# Patient Record
Sex: MALE | Race: WHITE | HISPANIC OR LATINO | URBAN - METROPOLITAN AREA
[De-identification: names, ages, dates, MRNs, and addresses within clinical notes are randomized per-mention and may not be internally consistent; named-entity substitution may affect disease eponyms.]

---
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
## 2024-04-03 ENCOUNTER — OFFICE VISIT (OUTPATIENT)
Dept: URGENT CARE | Age: 19
End: 2024-04-03
Payer: COMMERCIAL

## 2024-04-03 VITALS
RESPIRATION RATE: 18 BRPM | OXYGEN SATURATION: 98 % | TEMPERATURE: 97.8 F | HEIGHT: 70 IN | WEIGHT: 200.6 LBS | HEART RATE: 76 BPM | BODY MASS INDEX: 28.72 KG/M2 | SYSTOLIC BLOOD PRESSURE: 138 MMHG | DIASTOLIC BLOOD PRESSURE: 70 MMHG

## 2024-04-03 DIAGNOSIS — Z02.4 DRIVER'S PERMIT PE (PHYSICAL EXAMINATION): Primary | ICD-10-CM

## 2024-04-03 NOTE — PROGRESS NOTES
[facility]        NAME: Shaquille Mello is a 19 y.o. male  : 2005    MRN: [id number]  DATE: April 3, 2024  TIME: 12:59 PM    Assessment and Plan   's permit PE (physical examination) [Z02.4]  1. 's permit PE (physical examination)            It is my medical option that patient has no history of or evidence of chronic illnesses that would limit their ability to drive a non-commercial vehicle safely.   [place] [employer] paperwork was filled out to this effect and originals were returned to the patient.     Patient Instructions       Follow up with PCP in 3-5 days.  Proceed to  ER if symptoms worsen.    If tests have been performed at [facility], our office will contact you with results if changes need to be made to the care plan discussed with you at the visit.  [facility]    Chief Complaint     Chief Complaint   Patient presents with   • Annual Exam     Patient here for  permit physical         History of Present Illness           19 year old male presents for  licensing physical. Pt reports they have never driven in the [place] or any other state. Pt reports they are healthy and denies history of neurologic, neuropsychiatric, and cardiac disease. Pt denies further history of hypertension, uncontrolled epilepsy, uncontrolled diabetes, lapses in consciousness, cognitive impairments, alcohol abuse, and illicit drug use. Pt reports they have all appendages and denies use of prosthetic devices.          Review of Systems   Review of Systems   Neurological:  Negative for dizziness, tremors, seizures, syncope, weakness, numbness and headaches.   Psychiatric/Behavioral:  Negative for behavioral problems and confusion. The patient is not nervous/anxious.    All other systems reviewed and are negative.        Current Medications     No current outpatient medications on file.    Current Allergies     Allergies as of 2024   • (No  "Known Allergies)            The following portions of the patient's history were reviewed and updated as appropriate: allergies, current medications, past family history, past medical history, past social history, past surgical history and problem list.     No past medical history on file.    No past surgical history on file.    No family history on file.      Medications have been verified.        Objective   /70 (BP Location: Right arm, Patient Position: Sitting, Cuff Size: Standard)   Pulse 76   Temp 97.8 °F (36.6 °C)   Resp 18   Ht 5' 9.75\" (1.772 m)   Wt 91 kg (200 lb 9.6 oz)   SpO2 98%   BMI 28.99 kg/m²   No LMP for male patient.       Physical Exam     Physical Exam  Vitals and nursing note reviewed.   Constitutional:       General: He is not in acute distress.     Appearance: Normal appearance. He is well-developed and well-groomed.   HENT:      Head: Normocephalic and atraumatic.      Right Ear: Hearing and external ear normal.      Left Ear: Hearing and external ear normal.      Nose: Nose normal. No nasal deformity.      Mouth/Throat:      Lips: Pink. No lesions.      Mouth: Mucous membranes are moist.      Pharynx: Oropharynx is clear.   Eyes:      Extraocular Movements: Extraocular movements intact.      Conjunctiva/sclera: Conjunctivae normal.   Cardiovascular:      Rate and Rhythm: Normal rate.      Pulses: Normal pulses.      Heart sounds: Normal heart sounds.   Pulmonary:      Effort: Pulmonary effort is normal.      Breath sounds: Normal breath sounds.   Abdominal:      General: Abdomen is flat. Bowel sounds are normal.      Palpations: Abdomen is soft.   Musculoskeletal:         General: Normal range of motion.      Cervical back: Normal range of motion.   Skin:     General: Skin is warm and dry.      Capillary Refill: Capillary refill takes less than 2 seconds.   Neurological:      General: No focal deficit present.      Mental Status: He is alert and oriented to person, place, and " time. Mental status is at baseline.      Gait: Gait is intact. Gait and tandem walk normal.   Psychiatric:         Mood and Affect: Mood normal.         Behavior: Behavior normal.

## 2024-04-12 ENCOUNTER — TELEPHONE (OUTPATIENT)
Dept: PEDIATRICS CLINIC | Facility: CLINIC | Age: 19
End: 2024-04-12
